# Patient Record
Sex: FEMALE | Race: WHITE | NOT HISPANIC OR LATINO | URBAN - METROPOLITAN AREA
[De-identification: names, ages, dates, MRNs, and addresses within clinical notes are randomized per-mention and may not be internally consistent; named-entity substitution may affect disease eponyms.]

---

## 2021-05-26 ENCOUNTER — RECORDS - HEALTHEAST (OUTPATIENT)
Dept: ADMINISTRATIVE | Facility: CLINIC | Age: 41
End: 2021-05-26

## 2024-09-24 ENCOUNTER — HOSPITAL ENCOUNTER (OUTPATIENT)
Dept: GENERAL RADIOLOGY | Facility: CLINIC | Age: 44
Discharge: HOME | End: 2024-09-24
Attending: NURSE PRACTITIONER | Admitting: NURSE PRACTITIONER

## 2024-09-24 DIAGNOSIS — Z31.41 FERTILITY TESTING: ICD-10-CM

## 2024-09-24 LAB — HCG UR QL: NEGATIVE

## 2024-09-24 PROCEDURE — 58340 CATHETER FOR HYSTEROGRAPHY: CPT

## 2024-09-24 PROCEDURE — 81025 URINE PREGNANCY TEST: CPT | Performed by: NURSE PRACTITIONER

## 2024-09-24 PROCEDURE — 255N000002 HC RX 255 OP 636: Performed by: NURSE PRACTITIONER

## 2024-09-24 RX ORDER — IOPAMIDOL 612 MG/ML
30 INJECTION, SOLUTION INTRAVASCULAR ONCE
Status: COMPLETED | OUTPATIENT
Start: 2024-09-24 | End: 2024-09-24

## 2024-09-24 RX ADMIN — IOPAMIDOL 7 ML: 612 INJECTION, SOLUTION INTRAVENOUS at 10:39

## 2024-09-24 NOTE — DISCHARGE INSTRUCTIONS
HSG (Hysterosalpingogram) Discharge Instructions    Diet and medicines:  You may go back to your normal diet and medicines.  You may take Tylenol (acetaminophen) or Advil (ibuprofen).      Activity: You may go back to your normal routine.    Side effects:  Expect mild cramping today.  You may have bloody spotting or brown, sticky discharge for up to two days.    Other instructions:     Call your doctor if you have:  Hives.  Problems breathing.  Swelling.  Signs of infection, which include:  A fever over 101  F (38.3  C), taken under the tongue.  Unusual discharge from your vagina.  Pain in lower abdomen (belly).    Questions?   Call your hospital:   [facility] [phone]    Patient: ____________________________________    Instructor: __________________________________   Time: ________ Date: ____________